# Patient Record
Sex: FEMALE | Race: WHITE | NOT HISPANIC OR LATINO | Employment: UNEMPLOYED | URBAN - METROPOLITAN AREA
[De-identification: names, ages, dates, MRNs, and addresses within clinical notes are randomized per-mention and may not be internally consistent; named-entity substitution may affect disease eponyms.]

---

## 2024-11-12 DIAGNOSIS — M79.7 FIBROMYALGIA: ICD-10-CM

## 2024-11-12 RX ORDER — PREGABALIN 150 MG/1
150 CAPSULE ORAL 2 TIMES DAILY
Qty: 60 CAPSULE | Refills: 1 | Status: SHIPPED | OUTPATIENT
Start: 2024-11-12

## 2024-11-12 NOTE — TELEPHONE ENCOUNTER
Caller: Nunu Dennison    Relationship: Self    Best call back number: [phone]     Requested Prescriptions:   Requested Prescriptions     Pending Prescriptions Disp Refills    pregabalin (LYRICA) 150 MG capsule 60 capsule 1     Sig: Take 1 capsule by mouth 2 (Two) Times a Day.        Pharmacy where request should be sent: [facility] [id number] - [place], [place] [address] - [phone]  - [phone] FX     Last office visit with prescribing clinician: 4/18/2024   Last telemedicine visit with prescribing clinician: Visit date not found   Next office visit with prescribing clinician: Visit date not found     Additional details provided by patient: COMPLETELY OUT     Nathalia Christianson, TYE   11/12/24 12:00 EST

## 2024-11-14 PROCEDURE — 87186 SC STD MICRODIL/AGAR DIL: CPT | Performed by: NURSE PRACTITIONER

## 2024-11-14 PROCEDURE — 87086 URINE CULTURE/COLONY COUNT: CPT | Performed by: NURSE PRACTITIONER

## 2024-11-14 PROCEDURE — 87077 CULTURE AEROBIC IDENTIFY: CPT | Performed by: NURSE PRACTITIONER

## 2024-11-15 ENCOUNTER — TELEPHONE (OUTPATIENT)
Dept: URGENT CARE | Facility: CLINIC | Age: 39
End: 2024-11-15
Payer: COMMERCIAL

## 2024-11-15 DIAGNOSIS — T36.95XA ANTIBIOTIC-INDUCED YEAST INFECTION: ICD-10-CM

## 2024-11-15 DIAGNOSIS — R30.0 DYSURIA: Primary | ICD-10-CM

## 2024-11-15 DIAGNOSIS — B37.9 ANTIBIOTIC-INDUCED YEAST INFECTION: ICD-10-CM

## 2024-11-15 RX ORDER — FLUCONAZOLE 150 MG/1
TABLET ORAL
Qty: 2 TABLET | Refills: 0 | Status: SHIPPED | OUTPATIENT
Start: 2024-11-15

## 2024-11-15 RX ORDER — NITROFURANTOIN 25; 75 MG/1; MG/1
100 CAPSULE ORAL 2 TIMES DAILY
Qty: 14 CAPSULE | Refills: 0 | Status: SHIPPED | OUTPATIENT
Start: 2024-11-15

## 2024-11-15 NOTE — TELEPHONE ENCOUNTER
Having problems with cefdinir she is on for UTI- really bothering her stomach. Also now has yeast infection. Stop cefdinir and start macrobid, diflucan also sent to pharmacy.

## 2024-11-26 RX ORDER — BUSPIRONE HYDROCHLORIDE 30 MG/1
30 TABLET ORAL 2 TIMES DAILY
Qty: 60 TABLET | Refills: 3 | Status: SHIPPED | OUTPATIENT
Start: 2024-11-26

## 2025-01-07 RX ORDER — FLUOXETINE 40 MG/1
40 CAPSULE ORAL DAILY
Qty: 90 CAPSULE | Refills: 0 | Status: SHIPPED | OUTPATIENT
Start: 2025-01-07

## 2025-01-22 DIAGNOSIS — M79.7 FIBROMYALGIA: ICD-10-CM

## 2025-01-22 RX ORDER — PREGABALIN 150 MG/1
150 CAPSULE ORAL 2 TIMES DAILY
Qty: 60 CAPSULE | Refills: 1 | Status: SHIPPED | OUTPATIENT
Start: 2025-01-22

## 2025-01-22 NOTE — TELEPHONE ENCOUNTER
THYROID BEING REMOVED TOMORROW        Caller: Nunu GONCALVES    Relationship: Self    Best call back number: [phone] (Mobile)     Requested Prescriptions:   Requested Prescriptions     Pending Prescriptions Disp Refills    pregabalin (LYRICA) 150 MG capsule 60 capsule 1     Sig: Take 1 capsule by mouth 2 (Two) Times a Day.        Pharmacy where request should be sent: [facility] [id number]  [place] [address] - [phone] [facility] [phone]      Last office visit with prescribing clinician: 4/18/2024   Last telemedicine visit with prescribing clinician: Visit date not found   Next office visit with prescribing clinician: Visit date not found     Additional details provided by patient:     Does the patient have less than a 3 day supply:  [x] Yes  [] No    Would you like a call back once the refill request has been completed: [] Yes [] No    If the office needs to give you a call back, can they leave a voicemail: [] Yes [] No    Bell Martinez Rep   01/22/25 12:28 EST

## 2025-02-01 DIAGNOSIS — G47.00 INSOMNIA, UNSPECIFIED TYPE: ICD-10-CM

## 2025-02-03 ENCOUNTER — TELEPHONE (OUTPATIENT)
Dept: FAMILY MEDICINE CLINIC | Facility: CLINIC | Age: 40
End: 2025-02-03
Payer: COMMERCIAL

## 2025-02-03 RX ORDER — ZOLPIDEM TARTRATE 10 MG/1
10 TABLET ORAL
Qty: 30 TABLET | Refills: 0 | Status: SHIPPED | OUTPATIENT
Start: 2025-02-03

## 2025-03-03 DIAGNOSIS — G47.00 INSOMNIA, UNSPECIFIED TYPE: ICD-10-CM

## 2025-03-04 RX ORDER — ZOLPIDEM TARTRATE 10 MG/1
10 TABLET ORAL
Qty: 30 TABLET | Refills: 0 | Status: SHIPPED | OUTPATIENT
Start: 2025-03-04

## 2025-03-06 ENCOUNTER — OFFICE VISIT (OUTPATIENT)
Dept: FAMILY MEDICINE CLINIC | Facility: CLINIC | Age: 40
End: 2025-03-06
Payer: COMMERCIAL

## 2025-03-06 VITALS
OXYGEN SATURATION: 97 % | SYSTOLIC BLOOD PRESSURE: 112 MMHG | HEART RATE: 72 BPM | WEIGHT: 126 LBS | DIASTOLIC BLOOD PRESSURE: 70 MMHG | BODY MASS INDEX: 22.32 KG/M2 | HEIGHT: 63 IN

## 2025-03-06 DIAGNOSIS — E03.9 HYPOTHYROIDISM, UNSPECIFIED TYPE: ICD-10-CM

## 2025-03-06 DIAGNOSIS — Z00.00 PREVENTATIVE HEALTH CARE: ICD-10-CM

## 2025-03-06 DIAGNOSIS — M79.7 FIBROMYALGIA: Primary | ICD-10-CM

## 2025-03-06 DIAGNOSIS — F31.62 BIPOLAR DISORDER, CURRENT EPISODE MIXED, MODERATE: ICD-10-CM

## 2025-03-06 DIAGNOSIS — G47.00 INSOMNIA, UNSPECIFIED TYPE: ICD-10-CM

## 2025-03-06 DIAGNOSIS — R41.840 ATTENTION AND CONCENTRATION DEFICIT: ICD-10-CM

## 2025-03-06 DIAGNOSIS — F41.9 ANXIETY: ICD-10-CM

## 2025-03-06 DIAGNOSIS — E04.1 THYROID NODULE: ICD-10-CM

## 2025-03-06 PROCEDURE — 80061 LIPID PANEL: CPT | Performed by: NURSE PRACTITIONER

## 2025-03-06 PROCEDURE — 80050 GENERAL HEALTH PANEL: CPT | Performed by: NURSE PRACTITIONER

## 2025-03-06 PROCEDURE — 84439 ASSAY OF FREE THYROXINE: CPT | Performed by: NURSE PRACTITIONER

## 2025-03-06 PROCEDURE — 83036 HEMOGLOBIN GLYCOSYLATED A1C: CPT | Performed by: NURSE PRACTITIONER

## 2025-03-06 PROCEDURE — 36415 COLL VENOUS BLD VENIPUNCTURE: CPT | Performed by: NURSE PRACTITIONER

## 2025-03-06 PROCEDURE — 84481 FREE ASSAY (FT-3): CPT | Performed by: NURSE PRACTITIONER

## 2025-03-06 RX ORDER — LUMATEPERONE 21 MG/1
1 CAPSULE ORAL DAILY
Qty: 30 CAPSULE | Refills: 0 | Status: SHIPPED | OUTPATIENT
Start: 2025-03-06 | End: 2025-04-05

## 2025-03-06 RX ORDER — BUSPIRONE HYDROCHLORIDE 30 MG/1
30 TABLET ORAL 2 TIMES DAILY
Qty: 60 TABLET | Refills: 3 | Status: SHIPPED | OUTPATIENT
Start: 2025-03-06

## 2025-03-06 NOTE — PROGRESS NOTES
"Chief Complaint  Follow-up (Follow up for medication refills/had thyroid removed in January as well )    Subjective        Nunu GONCALVES presents to [facility]  History of Present Illness    Patient presents for follow-up visit.    Patient has anxiety, taking Buspar 30mg BID. She has history of Bipolar Disorder, tried and failed Seroquel/Depakote/Vraylar/Latuda/Trileptal related to adverse side effects.  Patient is taking Prozac 40 mg daily - does not feel mood is stable, feels \"on edge.\"   She is also taking Ambien 10 mg nightly for insomnia. Patient is concerned about Adult ADHD - describes difficulty focusing at work, easily distracted, changes tasks mid way through, is forgetful, misses appointments/obligations.      Thyroid nodule, US showed large left TR 3 nodule measuring up to 3.2 cm.  She was referred to Endocrinology, biopsy completed -benign.  Patient underwent thyroidectomy in January due to goiter and hx of multiple nodules.  Patient is taking Synthroid 50 mcg daily for hypothyroidism.       Patient is taking Lyrica 150mg BID for fibromyalgia. She reports symptoms well controlled, has no acute complaints       Objective   Vital Signs:  /70 (BP Location: Left arm, Patient Position: Sitting, Cuff Size: Adult)   Pulse 72   Ht 160 cm (63\")   Wt 57.2 kg (126 lb)   SpO2 97%   BMI 22.32 kg/m²   Estimated body mass index is 22.32 kg/m² as calculated from the following:    Height as of this encounter: 160 cm (63\").    Weight as of this encounter: 57.2 kg (126 lb).    BMI is within normal parameters. No other follow-up for BMI required.      Physical Exam  Constitutional:       Appearance: Normal appearance.   HENT:      Head: Normocephalic.   Cardiovascular:      Rate and Rhythm: Normal rate and regular rhythm.   Pulmonary:      Effort: Pulmonary effort is normal.      Breath sounds: Normal breath sounds.   Abdominal:      General: Abdomen is flat. Bowel sounds are " normal.      Palpations: Abdomen is soft.   Musculoskeletal:         General: Normal range of motion.      Cervical back: Neck supple.      Right lower leg: No edema.      Left lower leg: No edema.   Skin:     General: Skin is warm and dry.   Neurological:      Mental Status: She is alert and oriented to person, place, and time.      Gait: Gait is intact.   Psychiatric:         Attention and Perception: Attention normal.         Mood and Affect: Mood normal.         Speech: Speech normal.        Result Review :    CMP          4/17/2024    14:13 8/2/2024    14:01   CMP   Glucose 73  82    BUN 8  8    Creatinine 0.70  0.76    EGFR 114.4  103.0    Sodium 140  138    Potassium 4.0  3.9    Chloride 105  103    Calcium 8.9  9.1    Total Protein 6.6     Albumin 4.2     Globulin 2.4     Total Bilirubin 0.2     Alkaline Phosphatase 41     AST (SGOT) 25     ALT (SGPT) 29     Albumin/Globulin Ratio 1.8     BUN/Creatinine Ratio 11.4  10.5    Anion Gap 10.0  9.7      CBC          4/17/2024    14:13 4/25/2024    11:27 8/2/2024    14:01   CBC   WBC 4.92  4.89  6.79    RBC 3.79  4.29  3.97    Hemoglobin 11.5  13.1  12.0    Hematocrit 35.7  40.1  36.6    MCV 94.2  93.5  92.2    MCH 30.3  30.5  30.2    MCHC 32.2  32.7  32.8    RDW 12.3  11.8  12.0    Platelets 259  259  248        TSH          4/25/2024    11:27 10/15/2024    16:55   TSH   TSH 0.553  1.290                  Assessment and Plan   Diagnoses and all orders for this visit:    1. Fibromyalgia (Primary)  Assessment & Plan:  Stable on Lyrica 150 mg BID      2. Bipolar disorder, current episode mixed, moderate  Assessment & Plan:  Psychological condition is worsening.  Medication changes per orders.  Psychological condition  will be reassessed  in 6 weeks .    Orders:  -     Lumateperone Tosylate (Caplyta) 21 MG capsule; Take 1 capsule by mouth Daily for 30 days. Indications: Depressive Phase of Manic-Depression  Dispense: 30 capsule; Refill: 0    3. Hypothyroidism,  unspecified type  Assessment & Plan:  Continue Synthroid as prescribed, labs today    Orders:  -     TSH  -     T4, Free  -     T3, Free    4. Anxiety  Assessment & Plan:  Continue Prozac and Buspar as prescribed    Orders:  -     busPIRone (BUSPAR) 30 MG tablet; Take 1 tablet by mouth 2 (Two) Times a Day.  Dispense: 60 tablet; Refill: 3    5. Thyroid nodule  Assessment & Plan:  Reviewed Endo notes  Labs today  Patient reports she was advised to follow up with PCP - future Endo appointments were canceled      6. Insomnia, unspecified type  Assessment & Plan:  Stable on Ambien nightly PRN      7. Preventative health care  -     CBC (No Diff)  -     Comprehensive Metabolic Panel  -     Hemoglobin A1c  -     Lipid Panel    8. Attention and concentration deficit  Assessment & Plan:  ADHD screening questionnaire completed at visit - will consider treatment if no improvement with Caplyta             I spent 30 minutes caring for Nunu on this date of service. This time includes time spent by me in the following activities:preparing for the visit, reviewing tests, obtaining and/or reviewing a separately obtained history, performing a medically appropriate examination and/or evaluation , counseling and educating the patient/family/caregiver, ordering medications, tests, or procedures, documenting information in the medical record, and care coordination  Follow Up   Return in about 6 weeks (around 4/17/2025) for Anxiety/BPD.  Patient was given instructions and counseling regarding her condition or for health maintenance advice. Please see specific information pulled into the AVS if appropriate.

## 2025-03-06 NOTE — ASSESSMENT & PLAN NOTE
Psychological condition is worsening.  Medication changes per orders.  Psychological condition  will be reassessed  in 6 weeks .

## 2025-03-06 NOTE — PROGRESS NOTES
Venipuncture Blood Specimen Collection  Venipuncture performed in the right arm by Lucille Osuna MA with good hemostasis. Patient tolerated the procedure well without complications.   03/06/25   Lucille Osuna MA

## 2025-03-06 NOTE — ASSESSMENT & PLAN NOTE
Reviewed Endo notes  Labs today  Patient reports she was advised to follow up with PCP - future Endo appointments were canceled

## 2025-03-06 NOTE — ASSESSMENT & PLAN NOTE
ADHD screening questionnaire completed at visit - will consider treatment if no improvement with Caplyta

## 2025-03-07 LAB
ALBUMIN SERPL-MCNC: 4.3 G/DL (ref 3.5–5.2)
ALBUMIN/GLOB SERPL: 1.3 G/DL
ALP SERPL-CCNC: 42 U/L (ref 39–117)
ALT SERPL W P-5'-P-CCNC: 21 U/L (ref 1–33)
ANION GAP SERPL CALCULATED.3IONS-SCNC: 12.1 MMOL/L (ref 5–15)
AST SERPL-CCNC: 29 U/L (ref 1–32)
BILIRUB SERPL-MCNC: 0.3 MG/DL (ref 0–1.2)
BUN SERPL-MCNC: 8 MG/DL (ref 6–20)
BUN/CREAT SERPL: 10 (ref 7–25)
CALCIUM SPEC-SCNC: 9.1 MG/DL (ref 8.6–10.5)
CHLORIDE SERPL-SCNC: 101 MMOL/L (ref 98–107)
CHOLEST SERPL-MCNC: 184 MG/DL (ref 0–200)
CO2 SERPL-SCNC: 26.9 MMOL/L (ref 22–29)
CREAT SERPL-MCNC: 0.8 MG/DL (ref 0.57–1)
DEPRECATED RDW RBC AUTO: 42.2 FL (ref 37–54)
EGFRCR SERPLBLD CKD-EPI 2021: 96.9 ML/MIN/1.73
ERYTHROCYTE [DISTWIDTH] IN BLOOD BY AUTOMATED COUNT: 12.2 % (ref 12.3–15.4)
GLOBULIN UR ELPH-MCNC: 3.3 GM/DL
GLUCOSE SERPL-MCNC: 77 MG/DL (ref 65–99)
HBA1C MFR BLD: 4.8 % (ref 4.8–5.6)
HCT VFR BLD AUTO: 37.4 % (ref 34–46.6)
HDLC SERPL-MCNC: 93 MG/DL (ref 40–60)
HGB BLD-MCNC: 12.3 G/DL (ref 12–15.9)
LDLC SERPL CALC-MCNC: 81 MG/DL (ref 0–100)
LDLC/HDLC SERPL: 0.88 {RATIO}
MCH RBC QN AUTO: 31 PG (ref 26.6–33)
MCHC RBC AUTO-ENTMCNC: 32.9 G/DL (ref 31.5–35.7)
MCV RBC AUTO: 94.2 FL (ref 79–97)
PLATELET # BLD AUTO: 304 10*3/MM3 (ref 140–450)
PMV BLD AUTO: 10.7 FL (ref 6–12)
POTASSIUM SERPL-SCNC: 4.1 MMOL/L (ref 3.5–5.2)
PROT SERPL-MCNC: 7.6 G/DL (ref 6–8.5)
RBC # BLD AUTO: 3.97 10*6/MM3 (ref 3.77–5.28)
SODIUM SERPL-SCNC: 140 MMOL/L (ref 136–145)
T3FREE SERPL-MCNC: 2.67 PG/ML (ref 2–4.4)
T4 FREE SERPL-MCNC: 1.33 NG/DL (ref 0.92–1.68)
TRIGL SERPL-MCNC: 48 MG/DL (ref 0–150)
TSH SERPL DL<=0.05 MIU/L-ACNC: 0.25 UIU/ML (ref 0.27–4.2)
VLDLC SERPL-MCNC: 10 MG/DL (ref 5–40)
WBC NRBC COR # BLD AUTO: 6.59 10*3/MM3 (ref 3.4–10.8)

## 2025-03-07 NOTE — PROGRESS NOTES
Overall, labs look good.  The TSH is slightly low but her free T3 and free T4 look good so we will leave dosing as it stands.  Let me know if there are any questions

## 2025-03-30 DIAGNOSIS — G47.00 INSOMNIA, UNSPECIFIED TYPE: ICD-10-CM

## 2025-03-30 DIAGNOSIS — M79.7 FIBROMYALGIA: ICD-10-CM

## 2025-03-31 DIAGNOSIS — G47.00 INSOMNIA, UNSPECIFIED TYPE: ICD-10-CM

## 2025-03-31 DIAGNOSIS — M79.7 FIBROMYALGIA: ICD-10-CM

## 2025-03-31 RX ORDER — ZOLPIDEM TARTRATE 10 MG/1
10 TABLET ORAL
Qty: 30 TABLET | Refills: 1 | Status: SHIPPED | OUTPATIENT
Start: 2025-03-31

## 2025-03-31 RX ORDER — PREGABALIN 150 MG/1
150 CAPSULE ORAL 2 TIMES DAILY
Qty: 60 CAPSULE | Refills: 1 | Status: SHIPPED | OUTPATIENT
Start: 2025-03-31

## 2025-03-31 RX ORDER — ZOLPIDEM TARTRATE 10 MG/1
10 TABLET ORAL
Qty: 30 TABLET | Refills: 1 | OUTPATIENT
Start: 2025-03-31

## 2025-03-31 RX ORDER — PREGABALIN 150 MG/1
150 CAPSULE ORAL 2 TIMES DAILY
Qty: 60 CAPSULE | Refills: 1 | OUTPATIENT
Start: 2025-03-31

## 2025-03-31 NOTE — TELEPHONE ENCOUNTER
Caller: Nunu GONCALVES    Relationship: Self    Best call back number: [phone]     Requested Prescriptions:   Requested Prescriptions     Pending Prescriptions Disp Refills    zolpidem (AMBIEN) 10 MG tablet 30 tablet 1     Sig: Take 1 tablet by mouth every night at bedtime.    pregabalin (LYRICA) 150 MG capsule 60 capsule 1     Sig: Take 1 capsule by mouth 2 (Two) Times a Day.        Pharmacy where request should be sent: [facility] [id number]  [place] [address] - [phone] [facility] [phone] FX     Last office visit with prescribing clinician: 3/6/2025   Last telemedicine visit with prescribing clinician: Visit date not found   Next office visit with prescribing clinician: 4/17/2025     Additional details provided by patient:     Does the patient have less than a 3 day supply:  [x] Yes  [] No    Would you like a call back once the refill request has been completed: [] Yes [] No    If the office needs to give you a call back, can they leave a voicemail: [] Yes [] No    Bell Burris Rep   03/31/25 14:06 EDT

## 2025-04-04 RX ORDER — FLUOXETINE HYDROCHLORIDE 40 MG/1
40 CAPSULE ORAL DAILY
Qty: 90 CAPSULE | Refills: 0 | Status: SHIPPED | OUTPATIENT
Start: 2025-04-04

## 2025-04-17 ENCOUNTER — OFFICE VISIT (OUTPATIENT)
Dept: FAMILY MEDICINE CLINIC | Facility: CLINIC | Age: 40
End: 2025-04-17
Payer: COMMERCIAL

## 2025-04-17 VITALS
SYSTOLIC BLOOD PRESSURE: 122 MMHG | BODY MASS INDEX: 21.97 KG/M2 | DIASTOLIC BLOOD PRESSURE: 70 MMHG | WEIGHT: 124 LBS | HEART RATE: 69 BPM | OXYGEN SATURATION: 99 % | HEIGHT: 63 IN

## 2025-04-17 DIAGNOSIS — E03.9 HYPOTHYROIDISM, UNSPECIFIED TYPE: ICD-10-CM

## 2025-04-17 DIAGNOSIS — F31.62 BIPOLAR DISORDER, CURRENT EPISODE MIXED, MODERATE: ICD-10-CM

## 2025-04-17 DIAGNOSIS — M79.7 FIBROMYALGIA: ICD-10-CM

## 2025-04-17 DIAGNOSIS — G47.00 INSOMNIA, UNSPECIFIED TYPE: ICD-10-CM

## 2025-04-17 DIAGNOSIS — F41.9 ANXIETY: Primary | ICD-10-CM

## 2025-04-17 PROCEDURE — 99214 OFFICE O/P EST MOD 30 MIN: CPT | Performed by: NURSE PRACTITIONER

## 2025-04-17 RX ORDER — CLONAZEPAM 0.5 MG/1
0.5 TABLET ORAL NIGHTLY PRN
Qty: 30 TABLET | Refills: 0 | Status: SHIPPED | OUTPATIENT
Start: 2025-04-17

## 2025-04-17 RX ORDER — FLUOXETINE 10 MG/1
CAPSULE ORAL
Qty: 30 CAPSULE | Refills: 1 | Status: SHIPPED | OUTPATIENT
Start: 2025-04-17 | End: 2025-05-02

## 2025-04-17 RX ORDER — DESVENLAFAXINE 50 MG/1
50 TABLET, FILM COATED, EXTENDED RELEASE ORAL DAILY
Qty: 30 TABLET | Refills: 1 | Status: SHIPPED | OUTPATIENT
Start: 2025-04-17

## 2025-04-17 RX ORDER — BUPROPION HYDROCHLORIDE 100 MG/1
100 TABLET, EXTENDED RELEASE ORAL 2 TIMES DAILY
Qty: 60 TABLET | Refills: 1 | Status: SHIPPED | OUTPATIENT
Start: 2025-04-17

## 2025-04-17 NOTE — ASSESSMENT & PLAN NOTE
Psychological condition is worsening.  Medication changes per orders.  Referral to psychiatry.  Psychological condition  will be reassessed in 4 weeks.  Patient to call with worsening depression, intrusive thoughts or thoughts of SI

## 2025-04-17 NOTE — PROGRESS NOTES
"Chief Complaint  Follow-up (Follow up anxiety/BPD)    Subjective        Nunu GONCALVES presents to [facility]  History of Present Illness    Patient presents for follow-up visit.     Patient has anxiety, taking Buspar 30mg BID. She has history of Bipolar Disorder, tried and failed Seroquel/Depakote/Vraylar/Latuda/Trileptal/Caplyta/Lamictal related to adverse side effects.  Patient is taking Prozac 40 mg daily - does not feel mood is stable, feels \"on edge.\"  She is also having worsening depression, difficulty sleeping and panic that causes chest pain/tightness.  Patient having thoughts of death without thoughts of self harm. Patient is also taking Ambien 10 mg nightly for insomnia.     Patient is taking Synthroid 50 mcg daily for hypothyroidism.       Patient is taking Lyrica 150mg BID for fibromyalgia. She reports symptoms well controlled, has no acute complaints    Objective   Vital Signs:  /70 (BP Location: Left arm, Patient Position: Sitting, Cuff Size: Adult)   Pulse 69   Ht 160 cm (63\")   Wt 56.2 kg (124 lb)   SpO2 99%   BMI 21.97 kg/m²   Estimated body mass index is 21.97 kg/m² as calculated from the following:    Height as of this encounter: 160 cm (63\").    Weight as of this encounter: 56.2 kg (124 lb).    BMI is within normal parameters. No other follow-up for BMI required.      Physical Exam  Constitutional:       Appearance: Normal appearance.   HENT:      Head: Normocephalic.   Cardiovascular:      Rate and Rhythm: Normal rate and regular rhythm.   Pulmonary:      Effort: Pulmonary effort is normal.      Breath sounds: Normal breath sounds.   Abdominal:      General: Abdomen is flat. Bowel sounds are normal.      Palpations: Abdomen is soft.   Musculoskeletal:         General: Normal range of motion.      Cervical back: Neck supple.      Right lower leg: No edema.      Left lower leg: No edema.   Skin:     General: Skin is warm and dry.   Neurological:      " Mental Status: She is alert and oriented to person, place, and time.      Gait: Gait is intact.   Psychiatric:         Attention and Perception: Attention normal.         Mood and Affect: Mood normal.         Speech: Speech normal.        Result Review :    CMP          8/2/2024    14:01 3/6/2025    14:12   CMP   Glucose 82  77    BUN 8  8    Creatinine 0.76  0.80    EGFR 103.0  96.9    Sodium 138  140    Potassium 3.9  4.1    Chloride 103  101    Calcium 9.1  9.1    Total Protein  7.6    Albumin  4.3    Globulin  3.3    Total Bilirubin  0.3    Alkaline Phosphatase  42    AST (SGOT)  29    ALT (SGPT)  21    Albumin/Globulin Ratio  1.3    BUN/Creatinine Ratio 10.5  10.0    Anion Gap 9.7  12.1      CBC          4/25/2024    11:27 8/2/2024    14:01 3/6/2025    14:12   CBC   WBC 4.89  6.79  6.59    RBC 4.29  3.97  3.97    Hemoglobin 13.1  12.0  12.3    Hematocrit 40.1  36.6  37.4    MCV 93.5  92.2  94.2    MCH 30.5  30.2  31.0    MCHC 32.7  32.8  32.9    RDW 11.8  12.0  12.2    Platelets 259  248  304      Lipid Panel          3/6/2025    14:12   Lipid Panel   Total Cholesterol 184    Triglycerides 48    HDL Cholesterol 93    VLDL Cholesterol 10    LDL Cholesterol  81    LDL/HDL Ratio 0.88      TSH          4/25/2024    11:27 10/15/2024    16:55 3/6/2025    14:12   TSH   TSH 0.553  1.290  0.248      Most Recent A1C          3/6/2025    14:12   HGBA1C Most Recent   Hemoglobin A1C 4.80                Assessment and Plan   Diagnoses and all orders for this visit:    1. Anxiety (Primary)  Assessment & Plan:  Continue BuSpar as prescribed  Add Klonopin nightly as needed for severe anxiety or panic    Orders:  -     Ambulatory Referral to Psychiatry  -     buPROPion SR (Wellbutrin SR) 100 MG 12 hr tablet; Take 1 tablet by mouth 2 (Two) Times a Day.  Dispense: 60 tablet; Refill: 1  -     desvenlafaxine (Pristiq) 50 MG 24 hr tablet; Take 1 tablet by mouth Daily. Start once tapered Prozac  Dispense: 30 tablet; Refill: 1  -      clonazePAM (KlonoPIN) 0.5 MG tablet; Take 1 tablet by mouth At Night As Needed for Anxiety.  Dispense: 30 tablet; Refill: 0    2. Bipolar disorder, current episode mixed, moderate  Assessment & Plan:  Psychological condition is worsening.  Medication changes per orders.  Referral to psychiatry.  Psychological condition  will be reassessed in 4 weeks.  Patient to call with worsening depression, intrusive thoughts or thoughts of SI    Orders:  -     Ambulatory Referral to Psychiatry  -     buPROPion SR (Wellbutrin SR) 100 MG 12 hr tablet; Take 1 tablet by mouth 2 (Two) Times a Day.  Dispense: 60 tablet; Refill: 1  -     desvenlafaxine (Pristiq) 50 MG 24 hr tablet; Take 1 tablet by mouth Daily. Start once tapered Prozac  Dispense: 30 tablet; Refill: 1    3. Insomnia, unspecified type  Assessment & Plan:  Continue nightly Ambien    Orders:  -     Ambulatory Referral to Psychiatry    4. Hypothyroidism, unspecified type  Assessment & Plan:  Continue Synthroid as prescribed      5. Fibromyalgia  Assessment & Plan:  Stable on Lyrica 150 mg twice daily      Other orders  -     FLUoxetine (PROzac) 10 MG capsule; Take 2 capsules by mouth Daily for 5 days, THEN 1 capsule Daily for 5 days, THEN 1 capsule Every Other Day for 5 days.  Dispense: 30 capsule; Refill: 1           I spent 30 minutes caring for Nunu on this date of service. This time includes time spent by me in the following activities:preparing for the visit, reviewing tests, obtaining and/or reviewing a separately obtained history, performing a medically appropriate examination and/or evaluation , counseling and educating the patient/family/caregiver, ordering medications, tests, or procedures, documenting information in the medical record, and care coordination  Follow Up   Return in about 4 weeks (around 5/15/2025) for Depression/Anxiety.  Patient was given instructions and counseling regarding her condition or for health maintenance advice. Please see specific  information pulled into the AVS if appropriate.

## 2025-04-22 ENCOUNTER — TELEPHONE (OUTPATIENT)
Dept: FAMILY MEDICINE CLINIC | Facility: CLINIC | Age: 40
End: 2025-04-22
Payer: COMMERCIAL

## 2025-04-22 DIAGNOSIS — F41.9 ANXIETY: ICD-10-CM

## 2025-04-22 DIAGNOSIS — F31.62 BIPOLAR DISORDER, CURRENT EPISODE MIXED, MODERATE: ICD-10-CM

## 2025-04-22 RX ORDER — BUPROPION HYDROCHLORIDE 100 MG/1
100 TABLET, EXTENDED RELEASE ORAL 2 TIMES DAILY
Qty: 60 TABLET | Refills: 1 | Status: SHIPPED | OUTPATIENT
Start: 2025-04-22

## 2025-04-22 NOTE — TELEPHONE ENCOUNTER
It looks like it went through on April 17 but I did resend it today to the [facility].  Let patient know that if they do not receive within the next hour to please let us know as we will then fax it to them

## 2025-04-22 NOTE — TELEPHONE ENCOUNTER
Caller: Nunu GONCALVES    Relationship: Self    Best call back number: [phone] (Mobile)     What was the call regarding:     PHARMACY STILL HASN'T RECEIVED THE WELLBUTRIN YET     CAN YOU RESEND?     SHE IS ALSO HAVING A DIFFICULT TIME RIGHT NOW, AND HOPING THAT THE WELLBUTRIN WILL HELP       Is it okay if the provider responds through MyChart:

## 2025-04-23 ENCOUNTER — TELEPHONE (OUTPATIENT)
Dept: FAMILY MEDICINE CLINIC | Facility: CLINIC | Age: 40
End: 2025-04-23
Payer: COMMERCIAL

## 2025-04-23 ENCOUNTER — TELEPHONE (OUTPATIENT)
Dept: FAMILY MEDICINE CLINIC | Facility: CLINIC | Age: 40
End: 2025-04-23

## 2025-04-23 DIAGNOSIS — G47.00 INSOMNIA, UNSPECIFIED TYPE: Primary | ICD-10-CM

## 2025-04-23 RX ORDER — ZOLPIDEM TARTRATE 12.5 MG/1
12.5 TABLET, FILM COATED, EXTENDED RELEASE ORAL NIGHTLY PRN
Qty: 30 TABLET | Refills: 0 | Status: SHIPPED | OUTPATIENT
Start: 2025-04-23

## 2025-04-23 NOTE — TELEPHONE ENCOUNTER
Pt notified and expressed understanding. She stated that she has not slept in a few days and she is wondering about an increase on the Ambien. Please advise.

## 2025-04-23 NOTE — TELEPHONE ENCOUNTER
Ambien dosing does not go any higher unless we change her to a controlled release which is 12.5 mg.  We can try that or she can try adding melatonin to the Ambien

## 2025-04-23 NOTE — TELEPHONE ENCOUNTER
Pt notified and expressed understanding. She is willing to try the controlled release ambien to see if it helps because she has tried melatonin and that did not help at all.

## 2025-04-23 NOTE — TELEPHONE ENCOUNTER
Caller: Nunu GONCALVES    Relationship: Self    Best call back number: [phone]        What specialty or service is being requested: PSYCHIATRY      Any additional details: [facility] NOT TAK[facility] NEW PATIENTS.  SHE NEEDS TO GET IN SOMEWHERE QUICK.  CAN YOU PLEASE REFER SOMEONE ELSE ASAP AND ADVISE.

## 2025-04-23 NOTE — TELEPHONE ENCOUNTER
None of [facility] psychiatry is accepting new patients?  I was not aware of this.  Please send alternate urgent referral to the [facility]

## 2025-04-23 NOTE — TELEPHONE ENCOUNTER
Caller: Nunu GONCALVES    Relationship: Self    Best call back number: [phone]  What is the best time to reach you: ANYTIME    Who are you requesting to speak with (clinical staff, provider,  specific staff member): CLINICAL    What was the call regarding: PATIENT IS WANTING TO LET THE OFFICE KNOW SHE HAS GOTTEN A NEW PHONE NUMBER THAT SHE CAN BE CONTACTED AT AND THAT THEY ARE ABLE TO LEAVE MESSAGES ON.     SHE ALSO WOULD LIKE TO LET KIMBER[facility] KNOW SHE HAS NOT BEEN SLEEPING AT ALL AND WANTS TO KNOW IF SHE IS ABLE TO BUMP UP THE AMBIEN OR WHAT SHE SHOULD DO     Is it okay if the provider responds through MyChart: NO

## 2025-04-23 NOTE — TELEPHONE ENCOUNTER
Tried calling again to make sure she received her prescription there was no answer and vm not set up yet.

## 2025-04-23 NOTE — TELEPHONE ENCOUNTER
Referral faxed to [facility]. Provided patient with phone number to call and schedule.    Patient is wanting to know if she can have an increase on ambien as she is not sleeping well

## 2025-04-25 ENCOUNTER — TELEPHONE (OUTPATIENT)
Dept: FAMILY MEDICINE CLINIC | Facility: CLINIC | Age: 40
End: 2025-04-25
Payer: COMMERCIAL

## 2025-04-25 NOTE — TELEPHONE ENCOUNTER
Pt called and said she was supposed to let you know if things got worse with her mood and sleep. She has not been able to get her Ambien or Wellbutrin and will be picking that up today. Her pharmacy did not have it ready for her yesterday. She stated that she feels very down and has thoughts of harming herself. She stated she does not have an active plan, but then said if someone was going to hurt themselves would they even say anything about it anyway. She said she is really struggling and could not go to work today. She is not sure what to do about work. She knows that she needs a work note for today, but isn't sure what to do as she knows her mood needs to regulate. Please advise.

## 2025-04-25 NOTE — TELEPHONE ENCOUNTER
I think it would be best if she goes to [facility] or [facility] to get an immediate psychiatric evaluation.

## 2025-04-28 ENCOUNTER — TELEPHONE (OUTPATIENT)
Dept: FAMILY MEDICINE CLINIC | Facility: CLINIC | Age: 40
End: 2025-04-28
Payer: COMMERCIAL

## 2025-04-28 NOTE — TELEPHONE ENCOUNTER
For future missed dates, I would recommend she have her employer fax this paperwork so that we can complete intermittent FMLA.  I am fine with excusing the above-mentioned dates.  Has patient started her medications and did she go to the ER with increasing intrusive thoughts last week?

## 2025-04-28 NOTE — TELEPHONE ENCOUNTER
Caller: SACHA Nunu L    Relationship: Self    Best call back number: [phone]    What form or medical record are you requesting: EXCUSE LETTERS    Who is requesting this form or medical record from you: PATIENT    How would you like to receive the form or medical records (pick-up, mail, fax): FAX  If fax, what is the fax number: [phone]  If mail, what is the address:   If pick-up, provide patient with address and location details    Timeframe paperwork needed: ASAP    Additional notes:PATIENT ADVISED SHE HAS SPOKEN WITH HER Lucia Powers APRN AND THAT THE PCP ADVISED HER THAT IF SHE NEEDED A LETTER TO EXCUSE HER FOR WORK TO JUST CALL IN.     PATIENT IS NEEDING A LETTER TO EXCUSE HER FROM WORK FOR THE DATES OF 04/16/2025, 04/21/2025, 04/24/2025, AND 04/25/25.    PATIENT IS ALSO REQUESTING AN OPEN LETTER STATING THAT SHE MAY NEED TO TAKE FUTURE DATES OFF FROM WORK.

## 2025-04-28 NOTE — TELEPHONE ENCOUNTER
Spoke with patient and she stated that she has not been at her job long enough for FMLA and her boss advised her that as long as she has a letter this would help cover her job until she gets to the point of being able to get FMLA. She did not end of going to the ER because her daughter did not want her to go and she started the medication over the weekend. She did get some sleep and that has helped her some. She is having a lot of down times, but she is hoping now that she has been able to start the medication that she will begin to improve. She did say if things get severe she will go to the hospital.

## 2025-04-28 NOTE — TELEPHONE ENCOUNTER
Noted.  Make sure patient schedules appointment with psychiatry as referred.  She can have a note that says that work may need to be missed on an as needed basis for severe depression or anxiety.  You can date from today until June 1 and then we will need to reassess

## 2025-05-07 ENCOUNTER — TELEPHONE (OUTPATIENT)
Dept: FAMILY MEDICINE CLINIC | Facility: CLINIC | Age: 40
End: 2025-05-07
Payer: COMMERCIAL

## 2025-05-07 DIAGNOSIS — F41.9 ANXIETY: ICD-10-CM

## 2025-05-07 NOTE — TELEPHONE ENCOUNTER
While Xanax ask a little faster, it does not last as long.  Klonopin is designed to last longer so it is typically taken less frequently throughout the day.  Xanax may only last 4 to 6 hours.  They are very similar medications otherwise.  May be more appropriate to increase the dose instead of changing medications altogether if it is effective somewhat.  Has patient been scheduled with psychiatry?

## 2025-05-07 NOTE — TELEPHONE ENCOUNTER
Caller: SACHA Nunu L    Relationship: Self    Best call back number: [phone]    What medication are you requesting: XANAX    What are your current symptoms: DEPRESSION / ANXIETY     How long have you been experiencing symptoms:     Have you had these symptoms before:    [x] Yes  [] No    Have you been treated for these symptoms before:   [x] Yes  [] No    If a prescription is needed, what is your preferred pharmacy and phone number: [facility] [id number] - [place] - [address] - [phone] [facility] [phone] FX     Additional notes:      SINCE THIS HAS BEEN TALKED ABOUT, THE KLONOPIN IS TAKING THE EDGE OFF, NOT COMPLETELY. WOULD LIKE TO KNOW, IF A REFILL ON THE KLONOPIN IS WARRANTED OR WOULD THIS BE A GOOD TIME TO TRY THE XANAX.     PLEASE ADVISE

## 2025-05-07 NOTE — TELEPHONE ENCOUNTER
Caller: Nunu GONCALVES    Relationship: Self    Best call back number: [phone]    Requested Prescriptions:   Requested Prescriptions     Pending Prescriptions Disp Refills    clonazePAM (KlonoPIN) 0.5 MG tablet 30 tablet 0     Sig: Take 1 tablet by mouth At Night As Needed for Anxiety.        Pharmacy where request should be sent: [facility] [id number]  [place]  [address] [place] [address] - [phone]  - [phone] FX     Last office visit with prescribing clinician: 4/17/2025   Last telemedicine visit with prescribing clinician: Visit date not found   Next office visit with prescribing clinician: 5/20/2025     Additional details provided by patient: IS OUT     Does the patient have less than a 3 day supply:  [x] Yes  [] No    Would you like a call back once the refill request has been completed: [] Yes [x] No    If the office needs to give you a call back, can they leave a voicemail: [] Yes [x] No    Ronald Ventura   05/07/25 12:00 EDT

## 2025-05-08 DIAGNOSIS — F41.9 ANXIETY: ICD-10-CM

## 2025-05-08 RX ORDER — CLONAZEPAM 0.5 MG/1
0.5 TABLET ORAL NIGHTLY PRN
Qty: 30 TABLET | Refills: 0 | OUTPATIENT
Start: 2025-05-08

## 2025-05-08 RX ORDER — CLONAZEPAM 0.5 MG/1
1 TABLET ORAL NIGHTLY PRN
Qty: 60 TABLET | Refills: 0 | Status: SHIPPED | OUTPATIENT
Start: 2025-05-08

## 2025-05-08 NOTE — TELEPHONE ENCOUNTER
I am going to increase so that she can take 2 tablets nightly PRN, total of 1 mg. I would like her to get the paperwork filled out ASAP as it will take a few weeks to get an appointment thereafter

## 2025-05-08 NOTE — TELEPHONE ENCOUNTER
Sent Lucia another message because she is discussing increasing the dose. Will deny this for now and provider will send in different prescription.

## 2025-05-08 NOTE — TELEPHONE ENCOUNTER
Pt stated that she would like to try an increased dose of the klonopin to see if that helps. She is not yet scheduled with psychiatry. She has to get online and fill out forms they told her so she will try to do that asap so she can get scheduled.

## 2025-05-20 ENCOUNTER — OFFICE VISIT (OUTPATIENT)
Dept: FAMILY MEDICINE CLINIC | Facility: CLINIC | Age: 40
End: 2025-05-20
Payer: COMMERCIAL

## 2025-05-20 VITALS
WEIGHT: 117 LBS | HEART RATE: 77 BPM | SYSTOLIC BLOOD PRESSURE: 104 MMHG | OXYGEN SATURATION: 98 % | BODY MASS INDEX: 20.73 KG/M2 | HEIGHT: 63 IN | DIASTOLIC BLOOD PRESSURE: 60 MMHG

## 2025-05-20 DIAGNOSIS — F31.62 BIPOLAR DISORDER, CURRENT EPISODE MIXED, MODERATE: ICD-10-CM

## 2025-05-20 DIAGNOSIS — F41.9 ANXIETY: Primary | ICD-10-CM

## 2025-05-20 DIAGNOSIS — G47.00 INSOMNIA, UNSPECIFIED TYPE: ICD-10-CM

## 2025-05-20 PROCEDURE — 99214 OFFICE O/P EST MOD 30 MIN: CPT | Performed by: NURSE PRACTITIONER

## 2025-05-20 RX ORDER — OMEPRAZOLE 20 MG/1
20 CAPSULE, DELAYED RELEASE ORAL DAILY
COMMUNITY

## 2025-05-20 RX ORDER — ZOLPIDEM TARTRATE 12.5 MG/1
12.5 TABLET, FILM COATED, EXTENDED RELEASE ORAL NIGHTLY PRN
Qty: 30 TABLET | Refills: 0 | Status: SHIPPED | OUTPATIENT
Start: 2025-05-20

## 2025-05-20 RX ORDER — DESVENLAFAXINE 100 MG/1
100 TABLET, EXTENDED RELEASE ORAL DAILY
Qty: 30 TABLET | Refills: 2 | Status: SHIPPED | OUTPATIENT
Start: 2025-05-20

## 2025-05-20 NOTE — PROGRESS NOTES
"Chief Complaint  Follow-up (4 week follow up depression/anxiety )    Subjective        Nunu GONCALVES presents to [facility]  History of Present Illness    Patient presents for follow-up visit.  She was seen on April 17 with increased depression.  Patient described difficulty sleeping, panic that induces chest pain and tightness as well as thoughts of death.  She has history of Bipolar Disorder, tried and failed Seroquel/Depakote/Vraylar/Latuda/Trileptal/Caplyta/Lamictal related to adverse side effects.  Patient was started on Wellbutrin 100 mg twice daily.  She is taking BuSpar 30 mg twice daily.  Pristiq 50 mg added to regimen as well as Klonopin nightly as needed. Patient referred to Psychiatry, has not completed online forms for appointment scheduling. She has not started counseling. Patient does feel that her daytime anxiety has improved. She does have some nightmares. Triggers include recent divorce. Patient is sleeping better with Ambien CR 12.5 mg nightly.     Objective   Vital Signs:  /60 (BP Location: Left arm, Patient Position: Sitting, Cuff Size: Adult)   Pulse 77   Ht 160 cm (63\")   Wt 53.1 kg (117 lb)   SpO2 98%   BMI 20.73 kg/m²   Estimated body mass index is 20.73 kg/m² as calculated from the following:    Height as of this encounter: 160 cm (63\").    Weight as of this encounter: 53.1 kg (117 lb).    BMI is within normal parameters. No other follow-up for BMI required.      Physical Exam  Constitutional:       Appearance: Normal appearance.   HENT:      Head: Normocephalic.   Cardiovascular:      Rate and Rhythm: Normal rate and regular rhythm.   Pulmonary:      Effort: Pulmonary effort is normal.      Breath sounds: Normal breath sounds.   Abdominal:      General: Abdomen is flat. Bowel sounds are normal.      Palpations: Abdomen is soft.   Musculoskeletal:         General: Normal range of motion.      Cervical back: Neck supple.      Right lower leg: No " edema.      Left lower leg: No edema.   Skin:     General: Skin is warm and dry.   Neurological:      Mental Status: She is alert and oriented to person, place, and time.      Gait: Gait is intact.   Psychiatric:         Attention and Perception: Attention normal.         Mood and Affect: Mood normal.         Speech: Speech normal.        Result Review :                Assessment and Plan   Diagnoses and all orders for this visit:    1. Anxiety (Primary)  -     desvenlafaxine (Pristiq) 100 MG 24 hr tablet; Take 1 tablet by mouth Daily. Start once tapered Prozac  Dispense: 30 tablet; Refill: 2    2. Bipolar disorder, current episode mixed, moderate  -     desvenlafaxine (Pristiq) 100 MG 24 hr tablet; Take 1 tablet by mouth Daily. Start once tapered Prozac  Dispense: 30 tablet; Refill: 2    3. Insomnia, unspecified type  -     zolpidem CR (Ambien CR) 12.5 MG CR tablet; Take 1 tablet by mouth At Night As Needed for Sleep.  Dispense: 30 tablet; Refill: 0    Overall, patient's sx are improved. Increase Pristiq to 100 mg daily.  Continue Wellbutrin and BuSpar as prescribed.  Ambien will be refilled.  Patient can continue Klonopin nightly as needed for panic attacks.  Encourage patient to fill out paperwork so that she can get scheduled with Psychiatry.  She will follow-up with me in 4 weeks but call sooner if needed.       I spent 30 minutes caring for Nunu on this date of service. This time includes time spent by me in the following activities:preparing for the visit, obtaining and/or reviewing a separately obtained history, performing a medically appropriate examination and/or evaluation , counseling and educating the patient/family/caregiver, ordering medications, tests, or procedures, documenting information in the medical record, and care coordination  Follow Up   Return in about 4 weeks (around 6/17/2025) for Depression/Anxiety.  Patient was given instructions and counseling regarding her condition or for health  maintenance advice. Please see specific information pulled into the AVS if appropriate.

## 2025-05-21 RX ORDER — PANTOPRAZOLE SODIUM 40 MG/1
40 TABLET, DELAYED RELEASE ORAL DAILY
Qty: 30 TABLET | OUTPATIENT
Start: 2025-05-21

## 2025-05-29 ENCOUNTER — TELEPHONE (OUTPATIENT)
Dept: FAMILY MEDICINE CLINIC | Facility: CLINIC | Age: 40
End: 2025-05-29
Payer: COMMERCIAL

## 2025-05-29 NOTE — TELEPHONE ENCOUNTER
Caller: SACHA Nunu L    Relationship: Self    Best call back number:     [phone]       What medication are you requesting:     zolpidem (AMBIEN) 10 MG tablet       What are your current symptoms:     How long have you been experiencing symptoms:     Have you had these symptoms before:    [x] Yes  [] No    Have you been treated for these symptoms before:   [x] Yes  [] No    If a prescription is needed, what is your preferred pharmacy and phone number: [facility] [id number] - [place], [place] - [address] [place] [address] - [phone] [facility] [phone] FX     Additional notes: PATIENT IS CURRENTLY TAKING zolpidem CR (Ambien CR) 12.5 MG CR tablet  BUT SHE HAS NOT BEEN ABLE TO SLEEP AND SHE WAKES UP REALLY DROWSY THE NEXT MORNING.

## 2025-05-30 NOTE — TELEPHONE ENCOUNTER
Attending Statement: I agree with this updated visit.      Casandra Chambers MD Pt state that she felt the 10mg got her to sleep faster and she is hoping since increasing her klonopin that things will be better and ambien will be more effective for her.

## 2025-05-30 NOTE — TELEPHONE ENCOUNTER
Was the 10 mg effective enough?  I believe we increased it because it was not working.  Has she taken Lunesta or Restoril in the past?

## 2025-06-02 DIAGNOSIS — G47.00 INSOMNIA, UNSPECIFIED TYPE: ICD-10-CM

## 2025-06-02 RX ORDER — ZOLPIDEM TARTRATE 10 MG/1
10 TABLET ORAL NIGHTLY PRN
Qty: 30 TABLET | Refills: 1 | Status: SHIPPED | OUTPATIENT
Start: 2025-06-02 | End: 2025-07-02

## 2025-06-11 DIAGNOSIS — F41.9 ANXIETY: ICD-10-CM

## 2025-06-11 DIAGNOSIS — M79.7 FIBROMYALGIA: ICD-10-CM

## 2025-06-11 RX ORDER — CLONAZEPAM 0.5 MG/1
1 TABLET ORAL NIGHTLY PRN
Qty: 60 TABLET | Refills: 0 | Status: SHIPPED | OUTPATIENT
Start: 2025-06-11

## 2025-06-11 RX ORDER — PREGABALIN 150 MG/1
150 CAPSULE ORAL 2 TIMES DAILY
Qty: 60 CAPSULE | Refills: 1 | Status: SHIPPED | OUTPATIENT
Start: 2025-06-11

## 2025-06-11 NOTE — TELEPHONE ENCOUNTER
DELETE AFTER REVIEWING: Send the encounter HIGH priority, If patient has less than a 3 day supply. If the patient will run out of medication over the weekend add that information to the additional details line. Send to the appropriate pool. Check your call action grid or workflows.    Caller: Nunu GONCALVES JOSHUA    Relationship: Self    Best call back number: [phone]     Requested Prescriptions:   Requested Prescriptions     Pending Prescriptions Disp Refills    pregabalin (LYRICA) 150 MG capsule 60 capsule 1     Sig: Take 1 capsule by mouth 2 (Two) Times a Day.        Pharmacy where request should be sent: [facility] [id number] - [place] - [address] - [phone]  - [phone] FX     Last office visit with prescribing clinician: 5/20/2025   Last telemedicine visit with prescribing clinician: Visit date not found   Next office visit with prescribing clinician: 6/17/2025       Does the patient have less than a 3 day supply:  [x] Yes  [] No      Bell Saxena   06/11/25 10:08 EDT

## 2025-06-13 ENCOUNTER — TELEPHONE (OUTPATIENT)
Dept: FAMILY MEDICINE CLINIC | Facility: CLINIC | Age: 40
End: 2025-06-13

## 2025-06-13 NOTE — TELEPHONE ENCOUNTER
Caller: Nunu GONCALVES    Relationship: Self    Best call back number: [phone]       What specialty or service is being requested: PSYCHIATRY     What is the provider, practice or medical service name: UNKNOWN    PATIENT IS REQUESTING CALLING TO REQUEST SOMEONE FROM OFFICE CALL HER BACK AND GIVE HER THE NAME AND NUMBER OF PROVIDER SHE'S BEING REFERRED TO

## 2025-06-13 NOTE — TELEPHONE ENCOUNTER
Spoke with Nunu and informed. Gave phone number to [facility] [phone]

## 2025-06-17 ENCOUNTER — OFFICE VISIT (OUTPATIENT)
Dept: FAMILY MEDICINE CLINIC | Facility: CLINIC | Age: 40
End: 2025-06-17
Payer: COMMERCIAL

## 2025-06-17 VITALS
HEART RATE: 68 BPM | HEIGHT: 63 IN | WEIGHT: 113 LBS | DIASTOLIC BLOOD PRESSURE: 70 MMHG | BODY MASS INDEX: 20.02 KG/M2 | SYSTOLIC BLOOD PRESSURE: 112 MMHG | OXYGEN SATURATION: 98 %

## 2025-06-17 DIAGNOSIS — F41.9 ANXIETY: Primary | ICD-10-CM

## 2025-06-17 DIAGNOSIS — R10.31 RLQ ABDOMINAL PAIN: ICD-10-CM

## 2025-06-17 DIAGNOSIS — F31.62 BIPOLAR DISORDER, CURRENT EPISODE MIXED, MODERATE: ICD-10-CM

## 2025-06-17 LAB
BILIRUB UR QL STRIP: NEGATIVE
CLARITY UR: CLEAR
COLOR UR: YELLOW
DEPRECATED RDW RBC AUTO: 41 FL (ref 37–54)
ERYTHROCYTE [DISTWIDTH] IN BLOOD BY AUTOMATED COUNT: 12 % (ref 12.3–15.4)
GLUCOSE UR STRIP-MCNC: NEGATIVE MG/DL
HCT VFR BLD AUTO: 34.5 % (ref 34–46.6)
HGB BLD-MCNC: 11.3 G/DL (ref 12–15.9)
HGB UR QL STRIP.AUTO: NEGATIVE
HOLD SPECIMEN: NORMAL
KETONES UR QL STRIP: NEGATIVE
LEUKOCYTE ESTERASE UR QL STRIP.AUTO: NEGATIVE
MCH RBC QN AUTO: 30.8 PG (ref 26.6–33)
MCHC RBC AUTO-ENTMCNC: 32.8 G/DL (ref 31.5–35.7)
MCV RBC AUTO: 94 FL (ref 79–97)
NITRITE UR QL STRIP: NEGATIVE
PH UR STRIP.AUTO: 7 [PH] (ref 5–8)
PLATELET # BLD AUTO: 262 10*3/MM3 (ref 140–450)
PMV BLD AUTO: 10.8 FL (ref 6–12)
PROT UR QL STRIP: NEGATIVE
RBC # BLD AUTO: 3.67 10*6/MM3 (ref 3.77–5.28)
SP GR UR STRIP: 1.01 (ref 1–1.03)
UROBILINOGEN UR QL STRIP: NORMAL
WBC NRBC COR # BLD AUTO: 6.31 10*3/MM3 (ref 3.4–10.8)

## 2025-06-17 PROCEDURE — 81003 URINALYSIS AUTO W/O SCOPE: CPT | Performed by: NURSE PRACTITIONER

## 2025-06-17 PROCEDURE — 80053 COMPREHEN METABOLIC PANEL: CPT | Performed by: NURSE PRACTITIONER

## 2025-06-17 PROCEDURE — 85027 COMPLETE CBC AUTOMATED: CPT | Performed by: NURSE PRACTITIONER

## 2025-06-17 PROCEDURE — 99214 OFFICE O/P EST MOD 30 MIN: CPT | Performed by: NURSE PRACTITIONER

## 2025-06-17 PROCEDURE — 86140 C-REACTIVE PROTEIN: CPT | Performed by: NURSE PRACTITIONER

## 2025-06-17 PROCEDURE — 36415 COLL VENOUS BLD VENIPUNCTURE: CPT | Performed by: NURSE PRACTITIONER

## 2025-06-17 RX ORDER — DESVENLAFAXINE 50 MG/1
50 TABLET, FILM COATED, EXTENDED RELEASE ORAL DAILY
Qty: 30 TABLET | Refills: 1 | Status: SHIPPED | OUTPATIENT
Start: 2025-06-17

## 2025-06-17 NOTE — PROGRESS NOTES
Venipuncture Blood Specimen Collection  Venipuncture performed in the right arm by Lucille Osuna MA with good hemostasis. Patient tolerated the procedure well without complications.   06/17/25   Lucille Osuna MA

## 2025-06-17 NOTE — ASSESSMENT & PLAN NOTE
Psychological condition is improving with treatment.  Medication changes per orders.  Psychological condition  will be reassessed at the next regular appointment.  Schedule follow up appointment with Psychiatry

## 2025-06-17 NOTE — PROGRESS NOTES
"Chief Complaint  Follow-up (4 week follow up depression/anxiety ) and Abdominal Pain (Nausea with abdominal pain around her belly button for the last week )    Subjective        Nunu GONCALVES presents to [facility]  History of Present Illness    Patient presents for follow-up visit.  She was seen on April 17 with increased depression.  Patient described difficulty sleeping, panic that induces chest pain and tightness as well as thoughts of death.  She has history of Bipolar Disorder, tried and failed Seroquel/Depakote/Vraylar/Latuda/Trileptal/Caplyta/Lamictal related to adverse side effects.  Patient was started on Wellbutrin 100 mg twice daily.  She is taking BuSpar 30 mg twice daily.  Pristiq 50 mg added to regimen, increased to 100 mg daily as well as Klonopin nightly as needed. Patient referred to Psychiatry, has not completed online forms for appointment scheduling. She has not started counseling. Patient does feel that her daytime anxiety has improved. Patient describes \"less down days.\" She does have some nightmares. Triggers include recent divorce. Patient is sleeping better with Ambien 10 mg nightly. Ambien CR caused daytime fatigue.     Patient is c/o persistent RLQ pain x 1 week with nausea. She is concerned about appendix. Patient denies fever but has had vomiting. She is having regular bowel movements without blood in stool.      Objective   Vital Signs:  /70 (BP Location: Left arm, Patient Position: Sitting, Cuff Size: Adult)   Pulse 68   Ht 160 cm (63\")   Wt 51.3 kg (113 lb)   SpO2 98%   BMI 20.02 kg/m²   Estimated body mass index is 20.02 kg/m² as calculated from the following:    Height as of this encounter: 160 cm (63\").    Weight as of this encounter: 51.3 kg (113 lb).    BMI is within normal parameters. No other follow-up for BMI required.      Physical Exam  Constitutional:       Appearance: Normal appearance.   HENT:      Head: Normocephalic. "   Cardiovascular:      Rate and Rhythm: Normal rate and regular rhythm.   Pulmonary:      Effort: Pulmonary effort is normal.      Breath sounds: Normal breath sounds.   Abdominal:      General: Abdomen is flat. Bowel sounds are normal.      Palpations: Abdomen is soft.      Tenderness: There is abdominal tenderness in the right lower quadrant and suprapubic area.   Musculoskeletal:         General: Normal range of motion.      Cervical back: Neck supple.      Right lower leg: No edema.      Left lower leg: No edema.   Skin:     General: Skin is warm and dry.   Neurological:      Mental Status: She is alert and oriented to person, place, and time.      Gait: Gait is intact.   Psychiatric:         Attention and Perception: Attention normal.         Mood and Affect: Mood normal.         Speech: Speech normal.        Result Review :                Assessment and Plan   Diagnoses and all orders for this visit:    1. Anxiety (Primary)  Assessment & Plan:  Stable on Buspar BID and Klonopin PRN    Orders:  -     desvenlafaxine (Pristiq) 50 MG 24 hr tablet; Take 1 tablet by mouth Daily. Total of 150 mg daily  Dispense: 30 tablet; Refill: 1    2. Bipolar disorder, current episode mixed, moderate  Assessment & Plan:  Psychological condition is improving with treatment.  Medication changes per orders.  Psychological condition  will be reassessed at the next regular appointment.  Schedule follow up appointment with Psychiatry    Orders:  -     desvenlafaxine (Pristiq) 50 MG 24 hr tablet; Take 1 tablet by mouth Daily. Total of 150 mg daily  Dispense: 30 tablet; Refill: 1    3. RLQ abdominal pain  Comments:  1. Labs and UA today  2. Proceed with CT scan pending results  Orders:  -     Urinalysis With Culture If Indicated - Urine, Clean Catch; Future  -     CBC (No Diff)  -     Comprehensive Metabolic Panel  -     C-reactive Protein  -     Urinalysis With Culture If Indicated - Urine, Clean Catch           I spent 30 minutes  caring for Nunu on this date of service. This time includes time spent by me in the following activities:preparing for the visit, reviewing tests, obtaining and/or reviewing a separately obtained history, performing a medically appropriate examination and/or evaluation , counseling and educating the patient/family/caregiver, ordering medications, tests, or procedures, documenting information in the medical record, and care coordination  Follow Up   Return in about 3 months (around 9/17/2025) for Annual physical.  Patient was given instructions and counseling regarding her condition or for health maintenance advice. Please see specific information pulled into the AVS if appropriate.

## 2025-06-18 ENCOUNTER — RESULTS FOLLOW-UP (OUTPATIENT)
Dept: FAMILY MEDICINE CLINIC | Facility: CLINIC | Age: 40
End: 2025-06-18
Payer: COMMERCIAL

## 2025-06-18 DIAGNOSIS — R10.31 RLQ ABDOMINAL PAIN: Primary | ICD-10-CM

## 2025-06-18 LAB
ALBUMIN SERPL-MCNC: 4.3 G/DL (ref 3.5–5.2)
ALBUMIN/GLOB SERPL: 1.6 G/DL
ALP SERPL-CCNC: 34 U/L (ref 39–117)
ALT SERPL W P-5'-P-CCNC: 18 U/L (ref 1–33)
ANION GAP SERPL CALCULATED.3IONS-SCNC: 9.2 MMOL/L (ref 5–15)
AST SERPL-CCNC: 26 U/L (ref 1–32)
BILIRUB SERPL-MCNC: <0.2 MG/DL (ref 0–1.2)
BUN SERPL-MCNC: 7 MG/DL (ref 6–20)
BUN/CREAT SERPL: 7.5 (ref 7–25)
CALCIUM SPEC-SCNC: 8.9 MG/DL (ref 8.6–10.5)
CHLORIDE SERPL-SCNC: 107 MMOL/L (ref 98–107)
CO2 SERPL-SCNC: 23.8 MMOL/L (ref 22–29)
CREAT SERPL-MCNC: 0.93 MG/DL (ref 0.57–1)
CRP SERPL-MCNC: <0.3 MG/DL (ref 0–0.5)
EGFRCR SERPLBLD CKD-EPI 2021: 80.8 ML/MIN/1.73
GLOBULIN UR ELPH-MCNC: 2.7 GM/DL
GLUCOSE SERPL-MCNC: 91 MG/DL (ref 65–99)
POTASSIUM SERPL-SCNC: 4.1 MMOL/L (ref 3.5–5.2)
PROT SERPL-MCNC: 7 G/DL (ref 6–8.5)
SODIUM SERPL-SCNC: 140 MMOL/L (ref 136–145)

## 2025-06-18 NOTE — PROGRESS NOTES
Let patient know that her hemoglobin is a little low so I would recommend a daily multivitamin with iron supplementation.  Everything else looks okay.  I am ordering a CT scan of her abdomen based on pain and otherwise unremarkable labs.

## 2025-07-18 ENCOUNTER — APPOINTMENT (OUTPATIENT)
Dept: CT IMAGING | Facility: HOSPITAL | Age: 40
End: 2025-07-18
Payer: COMMERCIAL

## 2025-07-18 ENCOUNTER — HOSPITAL ENCOUNTER (EMERGENCY)
Facility: HOSPITAL | Age: 40
Discharge: HOME | End: 2025-07-18
Attending: EMERGENCY MEDICINE
Payer: COMMERCIAL

## 2025-07-18 VITALS
DIASTOLIC BLOOD PRESSURE: 71 MMHG | HEIGHT: 63 IN | WEIGHT: 106.7 LBS | RESPIRATION RATE: 22 BRPM | HEART RATE: 74 BPM | SYSTOLIC BLOOD PRESSURE: 110 MMHG | BODY MASS INDEX: 18.91 KG/M2 | TEMPERATURE: 98 F | OXYGEN SATURATION: 100 %

## 2025-07-18 DIAGNOSIS — K62.89 PROCTITIS: Primary | ICD-10-CM

## 2025-07-18 DIAGNOSIS — L50.9 HIVES: ICD-10-CM

## 2025-07-18 LAB
ALBUMIN SERPL-MCNC: 4.4 G/DL (ref 3.5–5.2)
ALBUMIN/GLOB SERPL: 1.7 G/DL
ALP SERPL-CCNC: 39 U/L (ref 39–117)
ALT SERPL W P-5'-P-CCNC: 15 U/L (ref 1–33)
ANION GAP SERPL CALCULATED.3IONS-SCNC: 13.1 MMOL/L (ref 5–15)
AST SERPL-CCNC: 25 U/L (ref 1–32)
BASOPHILS # BLD AUTO: 0.04 10*3/MM3 (ref 0–0.2)
BASOPHILS NFR BLD AUTO: 0.3 % (ref 0–1.5)
BILIRUB SERPL-MCNC: 0.2 MG/DL (ref 0–1.2)
BILIRUB UR QL STRIP: NEGATIVE
BUN SERPL-MCNC: 7.9 MG/DL (ref 6–20)
BUN/CREAT SERPL: 9.6 (ref 7–25)
CALCIUM SPEC-SCNC: 8.9 MG/DL (ref 8.6–10.5)
CHLORIDE SERPL-SCNC: 103 MMOL/L (ref 98–107)
CLARITY UR: CLEAR
CO2 SERPL-SCNC: 20.9 MMOL/L (ref 22–29)
COLOR UR: YELLOW
CREAT SERPL-MCNC: 0.82 MG/DL (ref 0.57–1)
D-LACTATE SERPL-SCNC: 0.4 MMOL/L (ref 0.3–2)
DEPRECATED RDW RBC AUTO: 45.5 FL (ref 37–54)
EGFRCR SERPLBLD CKD-EPI 2021: 94 ML/MIN/1.73
EOSINOPHIL # BLD AUTO: 0 10*3/MM3 (ref 0–0.4)
EOSINOPHIL NFR BLD AUTO: 0 % (ref 0.3–6.2)
ERYTHROCYTE [DISTWIDTH] IN BLOOD BY AUTOMATED COUNT: 13.1 % (ref 12.3–15.4)
GLOBULIN UR ELPH-MCNC: 2.6 GM/DL
GLUCOSE SERPL-MCNC: 115 MG/DL (ref 65–99)
GLUCOSE UR STRIP-MCNC: NEGATIVE MG/DL
HCT VFR BLD AUTO: 35.5 % (ref 34–46.6)
HGB BLD-MCNC: 11.5 G/DL (ref 12–15.9)
HGB UR QL STRIP.AUTO: NEGATIVE
HOLD SPECIMEN: NORMAL
HOLD SPECIMEN: NORMAL
IMM GRANULOCYTES # BLD AUTO: 0.05 10*3/MM3 (ref 0–0.05)
IMM GRANULOCYTES NFR BLD AUTO: 0.4 % (ref 0–0.5)
KETONES UR QL STRIP: NEGATIVE
LEUKOCYTE ESTERASE UR QL STRIP.AUTO: NEGATIVE
LIPASE SERPL-CCNC: 37 U/L (ref 13–60)
LYMPHOCYTES # BLD AUTO: 0.47 10*3/MM3 (ref 0.7–3.1)
LYMPHOCYTES NFR BLD AUTO: 4 % (ref 19.6–45.3)
MCH RBC QN AUTO: 30.7 PG (ref 26.6–33)
MCHC RBC AUTO-ENTMCNC: 32.4 G/DL (ref 31.5–35.7)
MCV RBC AUTO: 94.7 FL (ref 79–97)
MONOCYTES # BLD AUTO: 0.28 10*3/MM3 (ref 0.1–0.9)
MONOCYTES NFR BLD AUTO: 2.4 % (ref 5–12)
NEUTROPHILS NFR BLD AUTO: 11.03 10*3/MM3 (ref 1.7–7)
NEUTROPHILS NFR BLD AUTO: 92.9 % (ref 42.7–76)
NITRITE UR QL STRIP: NEGATIVE
NRBC BLD AUTO-RTO: 0 /100 WBC (ref 0–0.2)
PH UR STRIP.AUTO: 7.5 [PH] (ref 5–8)
PLATELET # BLD AUTO: 278 10*3/MM3 (ref 140–450)
PMV BLD AUTO: 9.8 FL (ref 6–12)
POTASSIUM SERPL-SCNC: 3.9 MMOL/L (ref 3.5–5.2)
PROT SERPL-MCNC: 7 G/DL (ref 6–8.5)
PROT UR QL STRIP: NEGATIVE
RBC # BLD AUTO: 3.75 10*6/MM3 (ref 3.77–5.28)
SODIUM SERPL-SCNC: 137 MMOL/L (ref 136–145)
SP GR UR STRIP: 1.04 (ref 1–1.03)
UROBILINOGEN UR QL STRIP: ABNORMAL
WBC NRBC COR # BLD AUTO: 11.87 10*3/MM3 (ref 3.4–10.8)
WHOLE BLOOD HOLD COAG: NORMAL
WHOLE BLOOD HOLD SPECIMEN: NORMAL

## 2025-07-18 PROCEDURE — 99285 EMERGENCY DEPT VISIT HI MDM: CPT

## 2025-07-18 PROCEDURE — 74177 CT ABD & PELVIS W/CONTRAST: CPT

## 2025-07-18 PROCEDURE — 87040 BLOOD CULTURE FOR BACTERIA: CPT

## 2025-07-18 PROCEDURE — 87040 BLOOD CULTURE FOR BACTERIA: CPT | Performed by: EMERGENCY MEDICINE

## 2025-07-18 PROCEDURE — 96374 THER/PROPH/DIAG INJ IV PUSH: CPT

## 2025-07-18 PROCEDURE — 96375 TX/PRO/DX INJ NEW DRUG ADDON: CPT

## 2025-07-18 PROCEDURE — 83605 ASSAY OF LACTIC ACID: CPT

## 2025-07-18 PROCEDURE — 25510000001 IOPAMIDOL PER 1 ML: Performed by: EMERGENCY MEDICINE

## 2025-07-18 PROCEDURE — 25010000002 MORPHINE PER 10 MG

## 2025-07-18 PROCEDURE — 25010000002 DIPHENHYDRAMINE PER 50 MG

## 2025-07-18 PROCEDURE — 25010000002 ONDANSETRON PER 1 MG

## 2025-07-18 PROCEDURE — 25810000003 SODIUM CHLORIDE 0.9 % SOLUTION

## 2025-07-18 PROCEDURE — 83690 ASSAY OF LIPASE: CPT

## 2025-07-18 PROCEDURE — 25010000002 FAMOTIDINE 10 MG/ML SOLUTION

## 2025-07-18 PROCEDURE — 36415 COLL VENOUS BLD VENIPUNCTURE: CPT

## 2025-07-18 PROCEDURE — 81003 URINALYSIS AUTO W/O SCOPE: CPT

## 2025-07-18 PROCEDURE — 80053 COMPREHEN METABOLIC PANEL: CPT | Performed by: EMERGENCY MEDICINE

## 2025-07-18 PROCEDURE — 85025 COMPLETE CBC W/AUTO DIFF WBC: CPT

## 2025-07-18 PROCEDURE — 25010000002 METHYLPREDNISOLONE PER 125 MG

## 2025-07-18 RX ORDER — IOPAMIDOL 755 MG/ML
100 INJECTION, SOLUTION INTRAVASCULAR
Status: COMPLETED | OUTPATIENT
Start: 2025-07-18 | End: 2025-07-18

## 2025-07-18 RX ORDER — MESALAMINE 1000 MG/1
1000 SUPPOSITORY RECTAL NIGHTLY
Qty: 7 EACH | Refills: 0 | Status: SHIPPED | OUTPATIENT
Start: 2025-07-18

## 2025-07-18 RX ORDER — METHYLPREDNISOLONE SODIUM SUCCINATE 125 MG/2ML
125 INJECTION, POWDER, LYOPHILIZED, FOR SOLUTION INTRAMUSCULAR; INTRAVENOUS ONCE
Status: COMPLETED | OUTPATIENT
Start: 2025-07-18 | End: 2025-07-18

## 2025-07-18 RX ORDER — FAMOTIDINE 10 MG/ML
20 INJECTION, SOLUTION INTRAVENOUS ONCE
Status: COMPLETED | OUTPATIENT
Start: 2025-07-18 | End: 2025-07-18

## 2025-07-18 RX ORDER — SODIUM CHLORIDE 0.9 % (FLUSH) 0.9 %
10 SYRINGE (ML) INJECTION AS NEEDED
Status: DISCONTINUED | OUTPATIENT
Start: 2025-07-18 | End: 2025-07-18 | Stop reason: HOSPADM

## 2025-07-18 RX ORDER — DIPHENHYDRAMINE HYDROCHLORIDE 50 MG/ML
25 INJECTION, SOLUTION INTRAMUSCULAR; INTRAVENOUS ONCE
Status: COMPLETED | OUTPATIENT
Start: 2025-07-18 | End: 2025-07-18

## 2025-07-18 RX ORDER — DOXYCYCLINE 100 MG/1
100 CAPSULE ORAL 2 TIMES DAILY
Qty: 14 CAPSULE | Refills: 0 | Status: SHIPPED | OUTPATIENT
Start: 2025-07-18

## 2025-07-18 RX ORDER — ONDANSETRON 2 MG/ML
4 INJECTION INTRAMUSCULAR; INTRAVENOUS ONCE
Status: COMPLETED | OUTPATIENT
Start: 2025-07-18 | End: 2025-07-18

## 2025-07-18 RX ORDER — DICYCLOMINE HCL 20 MG
20 TABLET ORAL EVERY 8 HOURS PRN
Qty: 20 TABLET | Refills: 0 | Status: SHIPPED | OUTPATIENT
Start: 2025-07-18

## 2025-07-18 RX ADMIN — SODIUM CHLORIDE 1000 ML: 9 INJECTION, SOLUTION INTRAVENOUS at 14:52

## 2025-07-18 RX ADMIN — DIPHENHYDRAMINE HYDROCHLORIDE 25 MG: 50 INJECTION, SOLUTION INTRAMUSCULAR; INTRAVENOUS at 18:18

## 2025-07-18 RX ADMIN — IOPAMIDOL 100 ML: 755 INJECTION, SOLUTION INTRAVENOUS at 15:35

## 2025-07-18 RX ADMIN — FAMOTIDINE 20 MG: 10 INJECTION INTRAVENOUS at 18:18

## 2025-07-18 RX ADMIN — MORPHINE SULFATE 4 MG: 4 INJECTION, SOLUTION INTRAMUSCULAR; INTRAVENOUS at 14:52

## 2025-07-18 RX ADMIN — METHYLPREDNISOLONE SODIUM SUCCINATE 125 MG: 125 INJECTION, POWDER, FOR SOLUTION INTRAMUSCULAR; INTRAVENOUS at 18:18

## 2025-07-18 RX ADMIN — ONDANSETRON 4 MG: 2 INJECTION, SOLUTION INTRAMUSCULAR; INTRAVENOUS at 14:52

## 2025-07-18 NOTE — DISCHARGE INSTRUCTIONS
[email or web address] - [place], [place]    [address], [place] [address] · [address]  [phone]    Follow up with gastroenterology    Follow up with PCP     Return as needed

## 2025-07-18 NOTE — Clinical Note
[facility] EMERGENCY DEPARTMENT  [address] [place] [address]  Phone: [phone]    Nunu GONCALVES was seen and treated in our emergency department on 7/18/2025.  She may return to work on 07/21/2025.         Thank you for choosing [facility].    Bianca Pearce, ROSALINO

## 2025-07-18 NOTE — Clinical Note
[facility] EMERGENCY DEPARTMENT  [address] [place] [address]  Phone: [phone]    Nunu GONCALVES was seen and treated in our emergency department on 7/18/2025.  She may return to work on 07/21/2025.         Thank you for choosing [facility].    Bianca Pearce, ROSALINO

## 2025-07-18 NOTE — ED PROVIDER NOTES
Subjective   History of Present Illness  Chief Complaint: Hives, abdominal pain      HPI: Patient is an 39-year-old female who presents by private vehicle with known history of thyroid dysfunction, migraines, rectal prolapse with complaints of abdominal discomfort as well as persistent hives.  She states the abdominal discomfort has been ongoing for several days she initially had problems with constipation states she did a cleanout and continues to have rectal discomfort.  As far as the reported hives she states this has been ongoing for over 1 month she has been seen by her PCP she was given a steroid shot but continues to have flareups she denies new lotions detergents or creams.  No known allergies.    PCP: Gio    History provided by:  Patient      Review of Systems  See above as noted    Past Medical History:   Diagnosis Date    Anxiety     Bipolar depression     Disease of thyroid gland     Insomnia     Migraines     Rectal prolapse        Allergies   Allergen Reactions    Cefdinir GI Intolerance    Hydromorphone Hives     Severe hives    Hydromorphone Hcl Hives    Latex Rash, Itching and Swelling    Penicillins Hives and Unknown (See Comments)     Other reaction(s): Hives  Other reaction(s): Hives  Other reaction(s): Hives    Adhesive Tape Rash    Wound Dressing Adhesive Itching       Past Surgical History:   Procedure Laterality Date    BREAST AUGMENTATION      BREAST RECONSTRUCTION, BREAST TISSUE EXPANDER REMOVAL, IMPLANT INSERTION      COLONOSCOPY N/A 8/12/2024    Procedure: COLONOSCOPY with random colon biopsies;  Surgeon: Marco A Acosta MD;  Location: [facility] ENDOSCOPY;  Service: General;  Laterality: N/A;    HYSTERECTOMY      TUBAL ABDOMINAL LIGATION         Family History   Problem Relation Age of Onset    Heart disease Mother     Aneurysm Mother     Ovarian cancer Maternal Grandmother     Colon cancer Maternal Grandmother     Prostate cancer Maternal Grandfather     Bone cancer Maternal Grandfather  "    Lung cancer Paternal Grandmother        Social History     Socioeconomic History    Marital status:     Number of children: 2    Highest education level: High school graduate   Tobacco Use    Smoking status: Never     Passive exposure: Never    Smokeless tobacco: Never   Vaping Use    Vaping status: Never Used   Substance and Sexual Activity    Alcohol use: Yes     Alcohol/week: 2.0 standard drinks of alcohol     Types: 1 Glasses of wine, 1 Cans of beer per week     Comment: Drinks Occasionally    Drug use: Never    Sexual activity: Defer           Objective   Physical Exam  Vitals reviewed.   HENT:      Head: Normocephalic.      Nose: Nose normal.   Eyes:      Pupils: Pupils are equal, round, and reactive to light.   Cardiovascular:      Pulses: Normal pulses.   Pulmonary:      Effort: Pulmonary effort is normal.   Abdominal:      General: Bowel sounds are normal.      Palpations: Abdomen is soft.      Tenderness: There is abdominal tenderness.   Genitourinary:     Comments: Rectal exam completed no external hemorrhoids noted Hemoccult negative.  Skin:     General: Skin is warm.      Findings: Rash present. Rash is urticarial.          Neurological:      General: No focal deficit present.      Mental Status: She is alert and oriented to person, place, and time.         Procedures           ED Course  ED Course as of 07/18/25 2345 Fri Jul 18, 2025   1751 Inspect completed patient has had a total of 5 prescribers for 8 prescriptions most recent fill 7/10/2025 Lyrica 150 quantity 60 for 30-day supply, 7/10/2025 was applied in for quantity 30 for 30-day supply. [BH]      ED Course User Index  [BH] Bianca Pearce, APRN      /71   Pulse 74   Temp 98 °F (36.7 °C)   Resp 22   Ht 160 cm (62.99\")   Wt 48.4 kg (106 lb 11.2 oz)   LMP  (LMP Unknown)   SpO2 100%   BMI 18.91 kg/m²   Labs Reviewed   COMPREHENSIVE METABOLIC PANEL - Abnormal; Notable for the following components:       Result Value    " Glucose 115 (*)     CO2 20.9 (*)     All other components within normal limits    Narrative:     GFR Categories in Chronic Kidney Disease (CKD)              GFR Category          GFR (mL/min/1.73)    Interpretation  G1                    90 or greater        Normal or high (1)  G2                    60-89                Mild decrease (1)  G3a                   45-59                Mild to moderate decrease  G3b                   30-44                Moderate to severe decrease  G4                    15-29                Severe decrease  G5                    14 or less           Kidney failure    (1)In the absence of evidence of kidney disease, neither GFR category G1 or G2 fulfill the criteria for CKD.    eGFR calculation 2021 CKD-EPI creatinine equation, which does not include race as a factor   CBC WITH AUTO DIFFERENTIAL - Abnormal; Notable for the following components:    WBC 11.87 (*)     RBC 3.75 (*)     Hemoglobin 11.5 (*)     Neutrophil % 92.9 (*)     Lymphocyte % 4.0 (*)     Monocyte % 2.4 (*)     Eosinophil % 0.0 (*)     Neutrophils, Absolute 11.03 (*)     Lymphocytes, Absolute 0.47 (*)     All other components within normal limits   URINALYSIS W/ CULTURE IF INDICATED - Abnormal; Notable for the following components:    Specific Gravity, UA 1.038 (*)     All other components within normal limits    Narrative:     In absence of clinical symptoms, the presence of pyuria, bacteria, and/or nitrites on the urinalysis result does not correlate with infection.  Urine microscopic not indicated.   LIPASE - Normal   POC LACTATE - Normal   BLOOD CULTURE   BLOOD CULTURE   RAINBOW DRAW    Narrative:     The following orders were created for panel order [place] Draw.  Procedure                               Abnormality         Status                     ---------                               -----------         ------                     Green Top (Gel)[[id number]]                                  Final result                Lavender Top[[id number]]                                     Final result               Gold Top - SST[[id number]]                                   Final result               Light Blue Top[[id number]]                                   Final result                 Please view results for these tests on the individual orders.   POC LACTATE   CBC AND DIFFERENTIAL    Narrative:     The following orders were created for panel order CBC & Differential.  Procedure                               Abnormality         Status                     ---------                               -----------         ------                     CBC Auto Differential[[id number]]        Abnormal            Final result                 Please view results for these tests on the individual orders.   GREEN TOP   LAVENDER TOP   GOLD TOP - SST   LIGHT BLUE TOP     Medications   ondansetron (ZOFRAN) injection 4 mg (4 mg Intravenous Given 7/18/25 1452)   morphine injection 4 mg (4 mg Intravenous Given 7/18/25 1452)   sodium chloride 0.9 % bolus 1,000 mL (0 mL Intravenous Stopped 7/18/25 1636)   iopamidol (ISOVUE-370) 76 % injection 100 mL (100 mL Intravenous Given 7/18/25 1535)   famotidine (PEPCID) injection 20 mg (20 mg Intravenous Given 7/18/25 1818)   diphenhydrAMINE (BENADRYL) injection 25 mg (25 mg Intravenous Given 7/18/25 1818)   methylPREDNISolone sodium succinate (SOLU-Medrol) injection 125 mg (125 mg Intravenous Given 7/18/25 1818)     CT Abdomen Pelvis With Contrast  Result Date: 7/18/2025  Impression: 1.Mild hyperenhancement of the rectal wall. Correlate for proctitis. 2.Right ovarian cyst measuring 3.5 cm. Prior hysterectomy. Electronically Signed: Warren Mei MD  7/18/2025 3:56 PM EDT  Workstation ID: [id number]                                                     Medical Decision Making  Patient presented with above complaints, noted physical exam was completed and IV was established and labs were obtained urinalysis negative for  urinary tract infection white blood cell count slightly elevated at 11 hemoglobin noted anemia 11.5 with review of patient chart this appears chronic, no acute renal insufficiency.  CT of the ab pelvis was obtained differentials considered but noninclusive diverticulitis, bowel obstruction, urolithiasis, cholecystitis, gastritis.  CT revealed if concern for proctitis, patient states that she had a colonoscopy last year, no reported abnormality.  After reviewing allergy list patient was started on doxycycline as well as mesalamine suppositories she was given dicyclomine for her abdominal discomfort.  Patient requested steroids Pepcid and Benadryl for the treatment of her of her recurring hives we discussed over-the-counter treatment including antihistamines I advised that she would likely benefit from allergist follow-up for additional evaluation and treatment.  Also advised to follow-up with gastroenterology.  Patient gave verbal understanding of this plan of care, no further questions or complaints at time of discharge.    Chart review:  7/16/2025 outpatient visit urgent care related to dermatitis    Labs: See ED course    Radiology: Imaging reviewed by me and interpreted by radiologist.    Medications Medications  ondansetron (ZOFRAN) injection 4 mg (4 mg Intravenous Given 7/18/25 1452)  morphine injection 4 mg (4 mg Intravenous Given 7/18/25 1452)  sodium chloride 0.9 % bolus 1,000 mL (0 mL Intravenous Stopped 7/18/25 1636)   iopamidol (ISOVUE-370) 76 % injection 100 mL (100 mL Intravenous Given 7/18/25 1535)  famotidine (PEPCID) injection 20 mg (20 mg Intravenous Given 7/18/25 1818)  diphenhydrAMINE (BENADRYL) injection 25 mg (25 mg Intravenous Given 7/18/25 1818)  methylPREDNISolone sodium succinate (SOLU-Medrol) injection 125 mg (125 mg Intravenous Given 7/18/25 1818)    Part of this note may be an electronic transcription/translation of spoken language to printed text using the Dragon Dictation  System.    Appropriate PPE worn during exam.      Note Disclaimer: At [facility], we believe that sharing information builds trust and better  relationships. You are receiving this note because you recently visited [facility]. It is possible you will see health information before a provider has talked with you about it. This kind of information can be easy to misunderstand. To help you fully understand what it means for your health, we urge you to discuss this note with your provider.      Problems Addressed:  Hives: complicated acute illness or injury  Proctitis: complicated acute illness or injury    Amount and/or Complexity of Data Reviewed  Labs: ordered.  Radiology: ordered.    Risk  Prescription drug management.        Final diagnoses:   Proctitis   Hives       ED Disposition  ED Disposition       ED Disposition   Discharge    Condition   Stable    Comment   --               Lucia Powers, ROSALINO  [address] [place] [address] [phone]          [facility]  [address] [facility] [address] [phone]  Schedule an appointment as soon as possible for a visit in 1 week  As needed, If symptoms worsen         Medication List        New Prescriptions      dicyclomine 20 MG tablet  Commonly known as: BENTYL  Take 1 tablet by mouth Every 8 (Eight) Hours As Needed for Abdominal Cramping.     doxycycline 100 MG capsule  Commonly known as: MONODOX  Take 1 capsule by mouth 2 (Two) Times a Day.     mesalamine 1000 MG suppository  Commonly known as: CANASA  Insert 1 suppository into the rectum Every Night.               Where to Get Your Medications        These medications were sent to [facility] [id number] - [place], [place] - [address] [facility]  - [phone]  - [phone] [address] [place] [address]      Phone: [phone]   dicyclomine 20 MG tablet  doxycycline 100 MG capsule  mesalamine 1000 MG  suppository            Bianca Pearce, APRN  07/18/25 0674

## 2025-07-23 LAB
BACTERIA SPEC AEROBE CULT: NORMAL
BACTERIA SPEC AEROBE CULT: NORMAL

## 2025-07-24 ENCOUNTER — TELEPHONE (OUTPATIENT)
Dept: FAMILY MEDICINE CLINIC | Facility: CLINIC | Age: 40
End: 2025-07-24
Payer: COMMERCIAL

## 2025-07-24 DIAGNOSIS — F41.9 ANXIETY: ICD-10-CM

## 2025-07-24 RX ORDER — CLONAZEPAM 0.5 MG/1
1 TABLET ORAL NIGHTLY PRN
Qty: 60 TABLET | Refills: 0 | Status: SHIPPED | OUTPATIENT
Start: 2025-07-24

## 2025-07-24 RX ORDER — FLUCONAZOLE 150 MG/1
150 TABLET ORAL ONCE
Qty: 1 TABLET | Refills: 0 | Status: SHIPPED | OUTPATIENT
Start: 2025-07-24 | End: 2025-07-24

## 2025-07-24 NOTE — TELEPHONE ENCOUNTER
Pt stated that she only has a couple days of antibiotic left and she did want to let you know she had a BM and it was still very mucus filled and some blood. She is hoping to get insurance over the next month and did say as soon as she did she would get scheduled for follow up with the GI.

## 2025-07-24 NOTE — TELEPHONE ENCOUNTER
Pt stated that she wants to follow up with the GI dr but right now she cannot because she lost her insurance as of today. She is very stressed because she is going through a divorce and now does not have insurance. She is not concerned with a UTI she said she did not tell anyone that, but she is having severe bloating and abdominal pain. Her stomach is very tender to the touch and she is having blood in her rectum with bowel movements. She stated that she is not sure the doxycycline is working to treat her stomach issues and wondered if the antibiotic should be changed. She also needs a refill on her Klonopin and I did send that a separate refill request.

## 2025-07-24 NOTE — TELEPHONE ENCOUNTER
Pt. Called back, it was Yeast infection, Not UTI, that was [facility] fault I wrote wrong thing in note. Please call back if need more info

## 2025-07-24 NOTE — TELEPHONE ENCOUNTER
Doxycycline is the typical antibiotic that treats the condition proctitis.  How many days left does she have?  I can send over Diflucan for the yeast infection  Klonopin refill  I am concerned about her inability to follow-up with GI

## 2025-07-24 NOTE — TELEPHONE ENCOUNTER
Patient was diagnosed with proctitis and she was to follow-up with GI.  Has an appointment been made?  Recommend she come to the office for a urine dip  As far as Klonopin, she is prescribed Klonopin twice daily already?.  Is she just needing a refill?

## 2025-07-24 NOTE — TELEPHONE ENCOUNTER
Pt. Called, she was in ER with ulcerative colitus, she was discharged on Antibiotics pepcid and benedryl.  The pain is not any better, still bleeding she is now getting a UTI and wanted to know if you would prescribe her some klonopin  for her anxiety, or zanax which you had asked her if she wanted earlier.[phone]

## 2025-07-25 NOTE — TELEPHONE ENCOUNTER
Noted, thank you. If there is still blood in stool after finishing Doxy, I will extend the antibiotic

## 2025-08-05 DIAGNOSIS — F41.9 ANXIETY: ICD-10-CM

## 2025-08-05 RX ORDER — BUSPIRONE HYDROCHLORIDE 30 MG/1
30 TABLET ORAL 2 TIMES DAILY
Qty: 60 TABLET | Refills: 3 | Status: SHIPPED | OUTPATIENT
Start: 2025-08-05

## 2025-08-11 DIAGNOSIS — M79.7 FIBROMYALGIA: ICD-10-CM

## 2025-08-11 DIAGNOSIS — F41.9 ANXIETY: ICD-10-CM

## 2025-08-11 DIAGNOSIS — F31.62 BIPOLAR DISORDER, CURRENT EPISODE MIXED, MODERATE: ICD-10-CM

## 2025-08-11 RX ORDER — DESVENLAFAXINE 50 MG/1
50 TABLET, FILM COATED, EXTENDED RELEASE ORAL DAILY
Qty: 30 TABLET | Refills: 1 | Status: SHIPPED | OUTPATIENT
Start: 2025-08-11

## 2025-08-11 RX ORDER — DESVENLAFAXINE 100 MG/1
100 TABLET, EXTENDED RELEASE ORAL DAILY
Qty: 30 TABLET | Refills: 2 | Status: SHIPPED | OUTPATIENT
Start: 2025-08-11

## 2025-08-11 RX ORDER — BUPROPION HYDROCHLORIDE 100 MG/1
100 TABLET, EXTENDED RELEASE ORAL 2 TIMES DAILY
Qty: 60 TABLET | Refills: 1 | Status: SHIPPED | OUTPATIENT
Start: 2025-08-11

## 2025-08-11 RX ORDER — PREGABALIN 150 MG/1
150 CAPSULE ORAL 2 TIMES DAILY
Qty: 60 CAPSULE | Refills: 1 | Status: SHIPPED | OUTPATIENT
Start: 2025-08-11

## 2025-08-12 ENCOUNTER — TELEPHONE (OUTPATIENT)
Dept: FAMILY MEDICINE CLINIC | Facility: CLINIC | Age: 40
End: 2025-08-12
Payer: COMMERCIAL

## 2025-08-12 DIAGNOSIS — G47.00 INSOMNIA, UNSPECIFIED TYPE: ICD-10-CM

## 2025-08-12 RX ORDER — ZOLPIDEM TARTRATE 10 MG/1
10 TABLET ORAL NIGHTLY PRN
Qty: 30 TABLET | Refills: 1 | Status: SHIPPED | OUTPATIENT
Start: 2025-08-12 | End: 2025-09-11

## 2025-08-19 ENCOUNTER — TELEPHONE (OUTPATIENT)
Dept: FAMILY MEDICINE CLINIC | Facility: CLINIC | Age: 40
End: 2025-08-19
Payer: COMMERCIAL

## 2025-08-20 DIAGNOSIS — E03.9 HYPOTHYROIDISM, UNSPECIFIED TYPE: Primary | ICD-10-CM

## 2025-08-20 RX ORDER — LEVOTHYROXINE SODIUM 88 UG/1
88 TABLET ORAL
Qty: 30 TABLET | Refills: 2 | Status: SHIPPED | OUTPATIENT
Start: 2025-08-20